# Patient Record
Sex: MALE | Race: WHITE | NOT HISPANIC OR LATINO | URBAN - METROPOLITAN AREA
[De-identification: names, ages, dates, MRNs, and addresses within clinical notes are randomized per-mention and may not be internally consistent; named-entity substitution may affect disease eponyms.]

---

## 2019-04-09 ENCOUNTER — OFFICE VISIT (OUTPATIENT)
Dept: URBAN - METROPOLITAN AREA CLINIC 76 | Facility: CLINIC | Age: 74
End: 2019-04-09
Payer: MEDICARE

## 2019-04-09 DIAGNOSIS — H40.013 OPEN ANGLE WITH BORDERLINE FINDINGS, LOW RISK, BILATERAL: Primary | ICD-10-CM

## 2019-04-09 DIAGNOSIS — H43.812 VITREOUS DEGENERATION, LEFT EYE: ICD-10-CM

## 2019-04-09 PROCEDURE — 92014 COMPRE OPH EXAM EST PT 1/>: CPT | Performed by: OPTOMETRIST

## 2019-04-09 ASSESSMENT — KERATOMETRY
OS: 43.75
OD: 43.38

## 2019-04-09 ASSESSMENT — INTRAOCULAR PRESSURE
OD: 11
OS: 11

## 2019-04-09 NOTE — IMPRESSION/PLAN
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 Plan: Discussed diagnosis in detail with patient. No treatment is required at this time. Need updated tests, will have pt come back next available for testing only VF 24-2 and OCT w/ no review. If abnormal will contact pt.

## 2019-04-22 ENCOUNTER — TESTING ONLY (OUTPATIENT)
Dept: URBAN - METROPOLITAN AREA CLINIC 76 | Facility: CLINIC | Age: 74
End: 2019-04-22
Payer: MEDICARE

## 2019-04-22 PROCEDURE — 92083 EXTENDED VISUAL FIELD XM: CPT | Performed by: OPTOMETRIST

## 2019-04-22 PROCEDURE — 92133 CPTRZD OPH DX IMG PST SGM ON: CPT | Performed by: OPTOMETRIST

## 2019-04-29 NOTE — IMPRESSION/PLAN
Impression: Diagnosis: Open angle with borderline findings, low risk, bilateral. Code: H40.013. Lg CDs but normal IOPs. VF 4/22/19: questionable reliability/worse OU. OCT 4/22/19: WNL/stable OU. VF changes don't correspond with normal/stable OCT. OCT more reliable than VF. Plan: Continue to monitor. Still NO drops needed at this time. VF/DE/OCT 4/2020. Task to [facility] to set appt reminder.

## 2020-05-13 ENCOUNTER — OFFICE VISIT (OUTPATIENT)
Dept: URBAN - METROPOLITAN AREA CLINIC 76 | Facility: CLINIC | Age: 75
End: 2020-05-13
Payer: MEDICARE

## 2020-05-13 DIAGNOSIS — Z96.1 PRESENCE OF INTRAOCULAR LENS: ICD-10-CM

## 2020-05-13 DIAGNOSIS — H43.813 VITREOUS DEGENERATION, BILATERAL: ICD-10-CM

## 2020-05-13 PROCEDURE — 92083 EXTENDED VISUAL FIELD XM: CPT | Performed by: OPTOMETRIST

## 2020-05-13 PROCEDURE — 92014 COMPRE OPH EXAM EST PT 1/>: CPT | Performed by: OPTOMETRIST

## 2020-05-13 PROCEDURE — 92133 CPTRZD OPH DX IMG PST SGM ON: CPT | Performed by: OPTOMETRIST

## 2020-05-13 ASSESSMENT — INTRAOCULAR PRESSURE
OS: 14
OD: 12

## 2020-05-13 NOTE — IMPRESSION/PLAN
Impression: Diagnosis: Open angle with borderline findings, low risk, bilateral. Code: H40.013. Lg CDs but normal IOPs. VF 5/13/20: WNL/stable OU. OCT 5/13/20: WNL/stable OU. Plan: Continue to monitor. Still NO drops needed at this time. Will monitor with DE/OCT, repeat 5/2021.